# Patient Record
Sex: FEMALE | Race: WHITE | NOT HISPANIC OR LATINO | Employment: OTHER | URBAN - METROPOLITAN AREA
[De-identification: names, ages, dates, MRNs, and addresses within clinical notes are randomized per-mention and may not be internally consistent; named-entity substitution may affect disease eponyms.]

---

## 2018-02-11 PROBLEM — H91.93 BILATERAL HEARING LOSS: Status: ACTIVE | Noted: 2018-02-11

## 2018-02-11 PROBLEM — R29.6 FREQUENT FALLS: Status: ACTIVE | Noted: 2018-02-11

## 2018-02-11 PROBLEM — I12.9 HYPERTENSIVE KIDNEY DISEASE WITH CKD STAGE III: Status: ACTIVE | Noted: 2018-02-11

## 2018-02-11 PROBLEM — Z91.81 RISK FOR FALLS: Status: ACTIVE | Noted: 2018-02-11

## 2018-02-11 PROBLEM — H61.23 BILATERAL IMPACTED CERUMEN: Status: ACTIVE | Noted: 2018-02-11

## 2018-02-11 PROBLEM — J98.4 CHRONIC LUNG DISEASE: Status: ACTIVE | Noted: 2018-02-11

## 2018-02-11 PROBLEM — F31.12 BIPOLAR AFFECTIVE DISORDER, CURRENTLY MANIC, MODERATE: Status: ACTIVE | Noted: 2018-02-11

## 2018-02-11 PROBLEM — N18.30 HYPERTENSIVE KIDNEY DISEASE WITH CKD STAGE III: Status: ACTIVE | Noted: 2018-02-11

## 2018-02-11 PROBLEM — G91.9 HYDROCEPHALUS: Status: ACTIVE | Noted: 2018-02-11

## 2018-02-11 PROBLEM — E03.9 ACQUIRED HYPOTHYROIDISM: Status: ACTIVE | Noted: 2018-02-11

## 2018-03-09 ENCOUNTER — OFFICE VISIT (OUTPATIENT)
Dept: NEUROLOGY | Facility: CLINIC | Age: 68
End: 2018-03-09
Payer: MEDICARE

## 2018-03-09 ENCOUNTER — TELEPHONE (OUTPATIENT)
Dept: NEUROLOGY | Facility: CLINIC | Age: 68
End: 2018-03-09

## 2018-03-09 VITALS
RESPIRATION RATE: 20 BRPM | BODY MASS INDEX: 22.16 KG/M2 | WEIGHT: 129.81 LBS | DIASTOLIC BLOOD PRESSURE: 72 MMHG | HEIGHT: 64 IN | SYSTOLIC BLOOD PRESSURE: 130 MMHG | HEART RATE: 73 BPM

## 2018-03-09 DIAGNOSIS — N39.41 URGE INCONTINENCE OF URINE: ICD-10-CM

## 2018-03-09 DIAGNOSIS — G91.9 HYDROCEPHALUS: Primary | ICD-10-CM

## 2018-03-09 DIAGNOSIS — R26.81 UNSTEADY GAIT: ICD-10-CM

## 2018-03-09 DIAGNOSIS — R41.3 MEMORY LOSS: ICD-10-CM

## 2018-03-09 PROCEDURE — 99999 PR PBB SHADOW E&M-EST. PATIENT-LVL IV: CPT | Mod: PBBFAC,,, | Performed by: PSYCHIATRY & NEUROLOGY

## 2018-03-09 PROCEDURE — 99204 OFFICE O/P NEW MOD 45 MIN: CPT | Mod: S$GLB,,, | Performed by: PSYCHIATRY & NEUROLOGY

## 2018-03-09 RX ORDER — BUDESONIDE AND FORMOTEROL FUMARATE DIHYDRATE 160; 4.5 UG/1; UG/1
2 AEROSOL RESPIRATORY (INHALATION) EVERY 12 HOURS
COMMUNITY
End: 2018-10-08

## 2018-03-09 NOTE — PROGRESS NOTES
Date: 3/9/2018    Patient ID: Mary Duvall is a 67 y.o. female.    Referring Provider:  Ketan Breaux II, MD    Chief Complaint: hydrocephalus and balance issues      History of Present Illness:  Ms. Duvall is a 67 y.o. female with bipolar disorder on lithium who presents referred by Ketan Breaux II, MD today for evaluation of hydrocephalus on imaging and imbalance. The patient was accompanied by her  who also contributed to the following history.     The patient developed imbalance and difficulty walking in 2013. She has frequent falls since that time. She does trip on things but also feels like she just suddenly loses her balance and falls. No blacking out or passing out except one time when she fell and hit her head it knocked out. Her  notices that her walking has gotten better recently and not worse. She feels like her feet are the issue. She walks better barefoot than with shoes. She has memory loss. Her  notes that she has been forgetting things--this started more recently. She has to write down a lot of things.     She has kidney disease. She has urinary frequency and urgency. She has accidents. This has been going on for years. She keeps a portable toilet by her bed for nighttime.     She has lost about 20 pounds over the past year. There has been no cause for the weight loss. She feels hungry but her  notes she doesn't eat a lot. She has blurry vision and is set to have cataract surgery soon. She has constant tinnitus. She sometimes has a spinning sensation. She has a bad cough and uses breathing machine. When she is working she has palpitations. She also endorses easy bruising, weakness, fatigue, muscle and joint pains, and rashes.     She has a mild tremor when writing. She feels her walking as changed because she is walking on the few bones in her feet. She has pain in her feet and ankles but no numbness/tingling.     She has followed with Dr. Scott. She had a  MRI in 2015 but she does not know what it showed. She brought a CD with her of these images. She is unsure what workup they did. She has been on lithium for a long time.       Review of Systems:   Comprehensive review of systems is negative except as mentioned in the HPI    Allergies:  Review of patient's allergies indicates:   Allergen Reactions    Codeine      Other reaction(s): ithing and nausea    Hydrocodone Itching       Current Medications:  Current Outpatient Prescriptions   Medication Sig Dispense Refill    acetaminophen (TYLENOL) 325 MG tablet Take 325 mg by mouth every 6 (six) hours as needed for Pain.      albuterol (PROVENTIL) 2.5 mg /3 mL (0.083 %) nebulizer solution Take 3 mLs (2.5 mg total) by nebulization every 6 (six) hours as needed for Wheezing. 120 each 1    albuterol 90 mcg/actuation inhaler Inhale 2 puffs into the lungs every 6 (six) hours as needed for Wheezing. 1 Inhaler 6    ALPRAZolam (XANAX) 0.5 MG tablet TAKE 1 TABLET BY MOUTH 2 TIMES DAILY AS NEEDED FOR ANXIETY 60 tablet 1    beclomethasone (QVAR) 80 mcg/actuation Aero Inhale 1 puff into the lungs 2 (two) times daily. Controller 1 each 6    budesonide-formoterol 160-4.5 mcg (SYMBICORT) 160-4.5 mcg/actuation HFAA Inhale 2 puffs into the lungs every 12 (twelve) hours. Controller      diphenhydrAMINE (BENADRYL) 25 mg capsule Take 25 mg by mouth every 6 (six) hours as needed for Itching.      fluticasone-vilanterol (BREO) 200-25 mcg/dose DsDv diskus inhaler Inhale 1 puff into the lungs once daily. Controller 1 each 0    hydroCHLOROthiazide (HYDRODIURIL) 50 MG tablet TAKE 1 TABLET BY MOUTH DAILY 30 tablet 9    lansoprazole (PREVACID) 30 MG capsule Take 30 mg by mouth once daily.      levothyroxine (SYNTHROID) 25 MCG tablet Take 1 tablet (25 mcg total) by mouth once daily. 30 tablet 3    lithium (ESKALITH) 300 MG capsule TAKE 2 CAPSULES DAILY AS DIRECTED 60 capsule 6    loratadine (CLARITIN) 10 mg tablet Take 10 mg by mouth  "once daily.      meloxicam (MOBIC) 15 MG tablet Take 15 mg by mouth once daily. With food.  2     No current facility-administered medications for this visit.        Past Medical History:  Past Medical History:   Diagnosis Date    Abdominal pain     Adverse effect of drug/medicinal     Bipolar affective disorder     Bunion     Cancer     COPD (chronic obstructive pulmonary disease)     Elevated alkaline phosphatase level     Encounter for therapeutic drug monitoring     Essential hypertension, benign     Family history of Alzheimer's disease     Foot pain, bilateral     GERD (gastroesophageal reflux disease)     Hip pain, right     Hyperglycemia     Hypokalemia     Knee pain     Murmur     Sciatica     Skin tag        Past Surgical History:  Past Surgical History:   Procedure Laterality Date    ADENOIDECTOMY      CHOLECYSTECTOMY      FOOT SURGERY Left 06/20/2006    MULTIPLE TOOTH EXTRACTIONS  04/2010    TONSILLECTOMY      TOTAL ABDOMINAL HYSTERECTOMY         Family History:  family history includes Heart attack in her mother; Heart disease in her mother; Heart failure in her mother; Kidney disease in her father; Stroke in her maternal grandfather.    Social History:   reports that she has never smoked. She has never used smokeless tobacco. She reports that she drinks alcohol.    Physical Exam:  Vitals:    03/09/18 0900   BP: 130/72   Pulse: 73   Resp: 20   Weight: 58.9 kg (129 lb 12.8 oz)   Height: 5' 4" (1.626 m)   PainSc: 0-No pain     Body mass index is 22.28 kg/m².  General: Well developed, well nourished.  No acute distress.  HEENT: Atraumatic, normocephalic.  Cardiovascular: No carotid bruits.   Musculoskeletal: No obvious joint deformities, moves all extremities well.  Skin: No obvious rashes    Neurological Exam:  Mental status: Awake, alert. Recent and remote memory appear to be intact.   Speech/Language: Edentulous dysarthria or aphasia on conversation.   Cranial nerves: Pupils " equal round and reactive to light, extraocular movements intact, facial strength and sensation intact bilaterally, palate and tongue midline, hearing grossly intact bilaterally.  Motor: 5 out of 5 strength throughout the upper and lower extremities bilaterally. Normal bulk and tone.   Sensation: Intact to light touch, pinprick, and vibration bilaterally.  DTR: 2+ at the knees and biceps bilaterally.  Coordination: Finger-nose-finger testing and rapid alternating movements normal bilaterally. Very mild action tremor. No intention tremor.  Normal heel to shin. No ataxia.   Gait: Cautious but steady gait. Able to heel, toe, and tandem walk. Narrow based.       Data:  I have personally reviewed the referring provider's notes, labs, & imaging made available to me today.       Labs:  CBC:   Lab Results   Component Value Date    WBC 7.61 12/16/2017    HGB 13.2 12/16/2017    HCT 42.3 12/16/2017     12/16/2017     (H) 12/16/2017    RDW 13.6 12/16/2017     BMP:   Lab Results   Component Value Date     12/16/2017    K 4.4 12/16/2017     12/16/2017    CO2 27 12/16/2017    BUN 18 12/16/2017    CREATININE 1.27 12/16/2017     12/16/2017    CALCIUM 10.6 (H) 12/16/2017     LFTS;   Lab Results   Component Value Date    PROT 7.2 12/16/2017    ALBUMIN 4.7 12/16/2017    BILITOT 0.7 12/16/2017    AST 27 12/16/2017    ALKPHOS 80 12/16/2017    ALT 33 12/16/2017     COAGS: No results found for: INR, PROTIME, PTT  FLP: No results found for: CHOL, HDL, LDLCALC, TRIG, CHOLHDL      Imaging:  I have personally reviewed the imaging, MRI brain from 2015 shows some enlargement of the ventricles and mild atrophy. Comparing to the more recent MRI from December 2017, there is more atrophy on the more recent scan but ventricles are similar in size.     Assessment and Plan:  Ms. Duvall is a 67 y.o. female with bipolar disorder on lithium who presents referred by Ketan Breaux II, MD today for evaluation of  hydrocephalus on imaging and imbalance.     Her exam is not characteristic of normal pressure hydrocephalus. Her exam is rather unrevealing and does not point to a neurologic cause for her gait disorder (i.e no ataxia, no neuropathy, no weakness, no parkinsonism, etc). Her imaging does show enlargement of the ventricles but I am not convinced these are out of proportion to her atrophy. She does have memory concerns and some urinary difficulties. Therefore, we will investigate for NPH by performing a LP with PT evaluation before and after for objective measures of improvement. If objective improvement, we will refer to neurosurgery. I will also check labs that could lead to imbalance or unsteadiness. Should these return negative, I think this may be a side effect of the lithium. Physical therapy with gait training would likely be beneficial for the patient.     We will also request the outside records from her [facility] neurologist for my review. I will plan to see her back after the LP for review.     Hydrocephalus  -     FL Lumbar Puncture (xpd); Future; Expected date: 03/09/2018  -     Ambulatory consult to Physical Therapy  -     Vitamin B12; Future; Expected date: 03/09/2018  -     VITAMIN E; Future; Expected date: 03/09/2018  -     Paraneoplastic Autoantibody Evaulation, Serum; Future; Expected date: 03/09/2018    Unsteady gait  -     FL Lumbar Puncture (xpd); Future; Expected date: 03/09/2018  -     Ambulatory consult to Physical Therapy  -     Vitamin B12; Future; Expected date: 03/09/2018  -     VITAMIN E; Future; Expected date: 03/09/2018  -     Paraneoplastic Autoantibody Evaulation, Serum; Future; Expected date: 03/09/2018    Urge incontinence of urine    Memory loss

## 2018-03-09 NOTE — LETTER
March 9, 2018      Ketan Breaux II, MD  [address]  [place] [address]           [facility]  [address] [facility] [address] [place] [address]  Phone: [phone]  Fax: [phone]          Patient: Mary Duvall   MR Number: [id number]   YOB: 1950   Date of Visit: 3/9/2018       Dear Dr. Ketan Breaux II:    Thank you for referring Mary Duvall to me for evaluation. Attached you will find relevant portions of my assessment and plan of care.    If you have questions, please do not hesitate to call me. I look forward to following Mary Duvall along with you.    Sincerely,    Denia Martin MD    Enclosure  CC:  No Recipients    If you would like to receive this communication electronically, please contact [email or web address] or [phone] to request more information on [employer] access.    For providers and/or their staff who would like to refer a patient to [facility], please contact us through our one-stop-shop provider referral line, [facility], at [phone].    If you feel you have received this communication in error or would no longer like to receive these types of communications, please e-mail [email or web address]

## 2018-05-21 PROBLEM — R26.9 GAIT ABNORMALITY: Status: ACTIVE | Noted: 2018-05-21

## 2018-11-07 ENCOUNTER — OFFICE VISIT (OUTPATIENT)
Dept: NEUROLOGY | Facility: CLINIC | Age: 68
End: 2018-11-07
Payer: MEDICARE

## 2018-11-07 VITALS
BODY MASS INDEX: 25.17 KG/M2 | WEIGHT: 137.63 LBS | RESPIRATION RATE: 18 BRPM | HEART RATE: 89 BPM | DIASTOLIC BLOOD PRESSURE: 90 MMHG | SYSTOLIC BLOOD PRESSURE: 134 MMHG

## 2018-11-07 DIAGNOSIS — R26.81 UNSTEADY GAIT: Primary | ICD-10-CM

## 2018-11-07 DIAGNOSIS — R29.6 FREQUENT FALLS: ICD-10-CM

## 2018-11-07 DIAGNOSIS — R41.3 MEMORY LOSS: ICD-10-CM

## 2018-11-07 DIAGNOSIS — R29.898 BILATERAL LEG WEAKNESS: ICD-10-CM

## 2018-11-07 PROCEDURE — 99215 OFFICE O/P EST HI 40 MIN: CPT | Mod: S$GLB,,, | Performed by: PSYCHIATRY & NEUROLOGY

## 2018-11-07 PROCEDURE — 1101F PT FALLS ASSESS-DOCD LE1/YR: CPT | Mod: S$GLB,,, | Performed by: PSYCHIATRY & NEUROLOGY

## 2018-11-07 PROCEDURE — 99999 PR PBB SHADOW E&M-EST. PATIENT-LVL IV: CPT | Mod: PBBFAC,,, | Performed by: PSYCHIATRY & NEUROLOGY

## 2018-11-07 NOTE — PROGRESS NOTES
Date: 11/7/2018    Patient ID: Mary Duvall is a 68 y.o. female.    Chief Complaint: Follow-up      History of Present Illness:  Ms. Duvall is a 68 y.o. female who presents for followup of gait trouble, frequent falls, and memory concerns. The patient was accompanied by her  who also contributed to the following history.     She has no lower extremity strength per her . She has fallen multiple times and hit her head, most recently 11/4/2018. Sometimes she gets dizzy or lightheaded before the fall. This has recently started. She also notes that sometimes her legs move faster than they ought to and her body can't keep up.     She takes benadryl every night. She takes xanax BID. She takes lithium and gabapentin as well.     She has gained about 8 pounds since March 2018.     She has some word finding difficulties and short term memory trouble. Her speech is slurred sometimes.     Review of Systems:   Comprehensive review of systems is negative except as mentioned in the HPI    Allergies:  Review of patient's allergies indicates:   Allergen Reactions    Codeine      Other reaction(s): ithing and nausea    Hydrocodone Itching       Current Medications:  Current Outpatient Medications   Medication Sig Dispense Refill    acetaminophen (TYLENOL) 325 MG tablet Take 325 mg by mouth every 6 (six) hours as needed for Pain.      albuterol (PROVENTIL) 2.5 mg /3 mL (0.083 %) nebulizer solution Take 3 mLs (2.5 mg total) by nebulization every 6 (six) hours as needed for Wheezing. 120 each 1    albuterol 90 mcg/actuation inhaler Inhale 2 puffs into the lungs every 6 (six) hours as needed for Wheezing. 1 Inhaler 6    ALPRAZolam (XANAX) 0.5 MG tablet TAKE 1 TABLET BY MOUTH 2 TIMES DAILY AS NEEDED FOR ANXIETY 60 tablet 1    diphenhydrAMINE (BENADRYL) 25 mg capsule Take 25 mg by mouth every 6 (six) hours as needed for Itching.      fluticasone-vilanterol (BREO) 200-25 mcg/dose DsDv diskus inhaler Inhale 1  puff into the lungs once daily. Controller 1 each 0    gabapentin (NEURONTIN) 600 MG tablet Take 600 mg by mouth 2 (two) times daily.  0    hydroCHLOROthiazide (HYDRODIURIL) 50 MG tablet TAKE 1 TABLET BY MOUTH DAILY 30 tablet 9    lansoprazole (PREVACID) 30 MG capsule Take 30 mg by mouth once daily.      lithium (ESKALITH) 300 MG capsule TAKE 2 CAPSULES DAILY AS DIRECTED 60 capsule 6    loratadine (CLARITIN) 10 mg tablet Take 10 mg by mouth once daily.       No current facility-administered medications for this visit.        Past Medical History:  Past Medical History:   Diagnosis Date    Abdominal pain     Adverse effect of drug/medicinal     Bipolar affective disorder     Bunion     Cancer     COPD (chronic obstructive pulmonary disease)     Elevated alkaline phosphatase level     Encounter for therapeutic drug monitoring     Essential hypertension, benign     Family history of Alzheimer's disease     Foot pain, bilateral     GERD (gastroesophageal reflux disease)     Hip pain, right     Hyperglycemia     Hypokalemia     Knee pain     Murmur     Sciatica     Skin tag        Past Surgical History:  Past Surgical History:   Procedure Laterality Date    ADENOIDECTOMY      CHOLECYSTECTOMY      FOOT SURGERY Left 06/20/2006    MULTIPLE TOOTH EXTRACTIONS  04/2010    TONSILLECTOMY      TOTAL ABDOMINAL HYSTERECTOMY         Family History:  family history includes Heart attack in her mother; Heart disease in her mother; Heart failure in her mother; Kidney disease in her father; Stroke in her maternal grandfather.    Social History:   reports that  has never smoked. she has never used smokeless tobacco. She reports that she drinks alcohol.    Physical Exam:  Vitals:    11/07/18 1510   BP: (!) 134/90   Pulse: 89   Resp: 18   Weight: 62.4 kg (137 lb 9.6 oz)   PainSc: 0-No pain     Body mass index is 25.17 kg/m².  General: Well developed, well nourished.  No acute distress.  Musculoskeletal: No  obvious joint deformities, moves all extremities well.  Peripheral vascular: No edema noted    Neurological Exam:  Mental status: Awake, alert. MOCA 17/30 (see below). Attention, concentration, and fund of knowledge regarding recent events normal.   Speech/Language: No dysarthria or aphasia on conversation.   Cranial nerves: Visual fields full. Pupils equal round and reactive to light, Cannot look up bilaterally, facial strength and sensation intact bilaterally, palate and tongue midline, hearing grossly intact bilaterally. Shoulder shrug normal bilaterally.   Motor: 5 out of 5 strength throughout the upper and lower extremities bilaterally except bilateral hip flexor weakness 3/5 bilaterally. Normal bulk and tone.   Sensation: Intact to light touch, pinprick, and vibration bilaterally but less on the right.  DTR: 2+ at the knees and biceps bilaterally.  Coordination: Finger-nose-finger testing and rapid alternating movements normal bilaterally. No tremor.   Gait: narrow based, shuffling, stooped posture, lurching and imbalanced, festination            Data:  I have personally reviewed the referring provider's notes, labs, & imaging made available to me today.       Labs:  CBC:   Lab Results   Component Value Date    WBC 6.12 09/05/2018    HGB 14.0 09/05/2018    HCT 44.5 09/05/2018     09/05/2018     (H) 09/05/2018    RDW 14.6 (H) 09/05/2018     BMP:   Lab Results   Component Value Date     09/05/2018    K 3.8 09/05/2018     09/05/2018    CO2 28 09/05/2018    BUN 18 09/05/2018    CREATININE 1.26 09/05/2018    GLU 89 09/05/2018    CALCIUM 10.5 (H) 09/05/2018     LFTS;   Lab Results   Component Value Date    PROT 8.1 09/05/2018    ALBUMIN 4.7 09/05/2018    BILITOT 0.5 09/05/2018    AST 31 09/05/2018    ALKPHOS 104 09/05/2018    ALT 29 09/05/2018         Imaging:  I have personally reviewed the imaging, CT head from ER last week shows no ventriculomegaly.     Assessment and Plan:  I am event  less suspicious today of NPH and find her exam today more concerning for possible PSP (superior gaze palsy, poor MOCA score, parkinsonian walk with festination and significant imbalance). I discussed this with them. Will check labs to rule out mimickers. Will also check a MRI lumbar spine given her significant hip flexor weakness bilaterally. We will check PET scan looking for hypometabolism in the brainstem to suggest PSP. I will see her back after testing to review results.     Unsteady gait  -     Vitamin B12; Future; Expected date: 11/07/2018  -     TSH; Future; Expected date: 11/07/2018  -     VITAMIN E; Future; Expected date: 11/07/2018  -     Paraneoplastic Autoantibody Evaulation, Serum; Future; Expected date: 11/07/2018  -     MRI Lumbar Spine Without Contrast; Future; Expected date: 11/07/2018  -     NM PET Brain; Future; Expected date: 11/07/2018  -     Ambulatory consult to Physical Therapy    Frequent falls  -     Vitamin B12; Future; Expected date: 11/07/2018  -     TSH; Future; Expected date: 11/07/2018  -     VITAMIN E; Future; Expected date: 11/07/2018  -     Paraneoplastic Autoantibody Evaulation, Serum; Future; Expected date: 11/07/2018  -     MRI Lumbar Spine Without Contrast; Future; Expected date: 11/07/2018  -     NM PET Brain; Future; Expected date: 11/07/2018  -     Ambulatory consult to Physical Therapy    Memory loss  -     Vitamin B12; Future; Expected date: 11/07/2018  -     TSH; Future; Expected date: 11/07/2018  -     VITAMIN E; Future; Expected date: 11/07/2018  -     Paraneoplastic Autoantibody Evaulation, Serum; Future; Expected date: 11/07/2018  -     MRI Lumbar Spine Without Contrast; Future; Expected date: 11/07/2018  -     NM PET Brain; Future; Expected date: 11/07/2018    Bilateral leg weakness  -     MRI Lumbar Spine Without Contrast; Future; Expected date: 11/07/2018  -     Ambulatory consult to Physical Therapy

## 2018-11-23 ENCOUNTER — HOSPITAL ENCOUNTER (OUTPATIENT)
Dept: RADIOLOGY | Facility: HOSPITAL | Age: 68
Discharge: HOME | End: 2018-11-23
Attending: PSYCHIATRY & NEUROLOGY
Payer: MEDICARE

## 2018-11-23 DIAGNOSIS — R29.898 BILATERAL LEG WEAKNESS: ICD-10-CM

## 2018-11-23 DIAGNOSIS — R26.81 UNSTEADY GAIT: ICD-10-CM

## 2018-11-23 DIAGNOSIS — R41.3 MEMORY LOSS: ICD-10-CM

## 2018-11-23 DIAGNOSIS — R29.6 FREQUENT FALLS: ICD-10-CM

## 2018-11-23 PROCEDURE — 72148 MRI LUMBAR SPINE W/O DYE: CPT | Mod: TC,PO

## 2018-11-23 PROCEDURE — 72148 MRI LUMBAR SPINE W/O DYE: CPT | Mod: 26,,, | Performed by: RADIOLOGY

## 2018-11-29 ENCOUNTER — TELEPHONE (OUTPATIENT)
Dept: NEUROLOGY | Facility: CLINIC | Age: 68
End: 2018-11-29

## 2018-11-29 ENCOUNTER — HOSPITAL ENCOUNTER (OUTPATIENT)
Dept: RADIOLOGY | Facility: HOSPITAL | Age: 68
Discharge: HOME | End: 2018-11-29
Attending: PSYCHIATRY & NEUROLOGY
Payer: MEDICARE

## 2018-11-29 ENCOUNTER — HOSPITAL ENCOUNTER (EMERGENCY)
Facility: HOSPITAL | Age: 68
Discharge: HOME | End: 2018-11-29
Attending: EMERGENCY MEDICINE
Payer: MEDICARE

## 2018-11-29 VITALS
SYSTOLIC BLOOD PRESSURE: 139 MMHG | TEMPERATURE: 98 F | DIASTOLIC BLOOD PRESSURE: 91 MMHG | HEIGHT: 65 IN | OXYGEN SATURATION: 99 % | BODY MASS INDEX: 22.49 KG/M2 | WEIGHT: 135 LBS | HEART RATE: 80 BPM | RESPIRATION RATE: 18 BRPM

## 2018-11-29 DIAGNOSIS — R29.6 FREQUENT FALLS: ICD-10-CM

## 2018-11-29 DIAGNOSIS — G44.309 POST-TRAUMATIC HEADACHE, NOT INTRACTABLE, UNSPECIFIED CHRONICITY PATTERN: ICD-10-CM

## 2018-11-29 DIAGNOSIS — R41.3 MEMORY LOSS: ICD-10-CM

## 2018-11-29 DIAGNOSIS — S01.01XA LACERATION OF SCALP WITHOUT FOREIGN BODY, INITIAL ENCOUNTER: Primary | ICD-10-CM

## 2018-11-29 DIAGNOSIS — R26.81 GAIT INSTABILITY: ICD-10-CM
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
## 2018-11-29 DIAGNOSIS — R26.81 UNSTEADY GAIT: ICD-10-CM

## 2018-11-29 LAB — POCT GLUCOSE: 95 MG/DL (ref 70–110)

## 2018-11-29 PROCEDURE — 78608 BRAIN IMAGING (PET): CPT | Mod: 26,PI,, | Performed by: RADIOLOGY

## 2018-11-29 PROCEDURE — 78608 BRAIN IMAGING (PET): CPT | Mod: TC

## 2018-11-29 PROCEDURE — 99284 EMERGENCY DEPT VISIT MOD MDM: CPT | Mod: 25,,, | Performed by: EMERGENCY MEDICINE

## 2018-11-29 PROCEDURE — 25000003 PHARM REV CODE 250: Performed by: PHYSICIAN ASSISTANT

## 2018-11-29 PROCEDURE — 12002 RPR S/N/AX/GEN/TRNK2.6-7.5CM: CPT

## 2018-11-29 PROCEDURE — 12002 RPR S/N/AX/GEN/TRNK2.6-7.5CM: CPT | Mod: ,,, | Performed by: EMERGENCY MEDICINE

## 2018-11-29 PROCEDURE — 93010 ELECTROCARDIOGRAM REPORT: CPT | Mod: ,,, | Performed by: INTERNAL MEDICINE

## 2018-11-29 PROCEDURE — 99285 EMERGENCY DEPT VISIT HI MDM: CPT | Mod: 25

## 2018-11-29 PROCEDURE — 93005 ELECTROCARDIOGRAM TRACING: CPT

## 2018-11-29 RX ORDER — ACETAMINOPHEN 325 MG/1
650 TABLET ORAL
Status: COMPLETED | OUTPATIENT
Start: 2018-11-29 | End: 2018-11-29

## 2018-11-29 RX ORDER — LIDOCAINE HYDROCHLORIDE 10 MG/ML
5 INJECTION, SOLUTION EPIDURAL; INFILTRATION; INTRACAUDAL; PERINEURAL
Status: COMPLETED | OUTPATIENT
Start: 2018-11-29 | End: 2018-11-29

## 2018-11-29 RX ADMIN — ACETAMINOPHEN 650 MG: 325 TABLET ORAL at 07:11

## 2018-11-29 RX ADMIN — LIDOCAINE HYDROCHLORIDE 50 MG: 10 INJECTION, SOLUTION EPIDURAL; INFILTRATION; INTRACAUDAL; PERINEURAL at 06:11

## 2018-11-29 NOTE — TELEPHONE ENCOUNTER
----- Message from Denia Martin MD sent at 11/26/2018  9:22 AM CST -----  The MRI of the low back shows potential for a pinched nerve in the back but it is at a different level than I would expect for the weakness I found in your legs. I recommend a test called EMG/nerve conduction study. This is a nerve and muscle test that can help us determine why someone has weakness or if the possible pinched nerve in the back has anything to do with it. The test is done in 2 parts. They will give your nerves little electric shocks and then the second part they take a tiny needle and put it into the nerves to measure the muscle activity. Let us know if you have any questions. You should be contacted to schedule this soon.

## 2018-11-29 NOTE — ED TRIAGE NOTES
Slip and fall while in the parking hitting the back of head, +LOC, bleeding controlled at this time, c-collar removed per patient, pt denies neck pain at this time.

## 2018-11-29 NOTE — ED PROVIDER NOTES
Encounter Date: 11/29/2018    SCRIBE #1 NOTE: I, Kimberly Pina, am scribing for, and in the presence of,  Dr. Moscoso. I have scribed the following portions of the note - the APC attestation.       History     Chief Complaint   Patient presents with    Head Injury     trip and fall, not on blood thinners, hit head, family member reports + loc     Patient is a 68 year old female with PMHX of Alzheimer's disease, HTN, GERD, COPD, hypothyroidism, and bipolar disorder. She presents to the ED for head injury s/p fall. Brother in law reports patient ambulating within parking garage and falling backwards and striking right side of head onto ground. + LOC. Patient denies hx of anticoagulation. Patient reports having diffuse headache. Describes pain as constant and stabbing. Rates pain 10/10. Patient with hx of frequent falls and gait instability since 2013. Patient underwent NM PET scan of brain for further evaluation of gait ataxia today.       The history is provided by the patient and a relative. No  was used.     Review of patient's allergies indicates:   Allergen Reactions    Codeine      Other reaction(s): ithing and nausea    Hydrocodone Itching     Past Medical History:   Diagnosis Date    Abdominal pain     Adverse effect of drug/medicinal     Bipolar affective disorder     Bunion     Cancer     COPD (chronic obstructive pulmonary disease)     Elevated alkaline phosphatase level     Encounter for therapeutic drug monitoring     Essential hypertension, benign     Family history of Alzheimer's disease     Foot pain, bilateral     GERD (gastroesophageal reflux disease)     Hip pain, right     Hyperglycemia     Hypokalemia     Knee pain     Murmur     Sciatica     Skin tag      Past Surgical History:   Procedure Laterality Date    ADENOIDECTOMY      CHOLECYSTECTOMY      FOOT SURGERY Left 06/20/2006    MULTIPLE TOOTH EXTRACTIONS  04/2010    TONSILLECTOMY      TOTAL  ABDOMINAL HYSTERECTOMY       Family History   Problem Relation Age of Onset    Heart failure Mother     Heart disease Mother     Heart attack Mother     Stroke Maternal Grandfather     Kidney disease Father      Social History     Tobacco Use    Smoking status: Never Smoker    Smokeless tobacco: Never Used   Substance Use Topics    Alcohol use: No     Frequency: Never    Drug use: No     Review of Systems   Unable to perform ROS: Dementia       Physical Exam     Initial Vitals [11/29/18 1542]   BP Pulse Resp Temp SpO2   (!) 130/57 89 18 98.1 °F (36.7 °C) 99 %      MAP       --         Physical Exam    Vitals reviewed.  Constitutional: She appears well-developed and well-nourished. No distress.   HENT:   Head: Normocephalic. Head is with laceration (3 cm laceration over right parietal region).   Eyes: Conjunctivae and EOM are normal.   Neck: Normal range of motion. No spinous process tenderness and no muscular tenderness present. Normal range of motion present.   Cardiovascular: Normal rate and regular rhythm.   Murmur heard.  Pulmonary/Chest: Breath sounds normal. No respiratory distress. She has no wheezes. She has no rales.   Abdominal: Soft. Bowel sounds are normal. She exhibits no distension. There is no tenderness.   Musculoskeletal: Normal range of motion.   No midline spinal tenderness.    Neurological: She is alert. She has normal strength. No sensory deficit. Coordination normal. GCS eye subscore is 4. GCS verbal subscore is 4. GCS motor subscore is 6.   Patient oriented to self and time. Patient disoriented to place. Motor strength of b/l UE and LE 5/5. Finger to nose negative. Pronator drift negative. No facial droop or asymmetry. Speech is clear. Tongue protrudes midline with no fasciculations. Gait not assessed.   Skin: Skin is warm and dry. No erythema.         ED Course   Lac Repair  Date/Time: 11/29/2018 7:07 PM  Performed by: Christy Hudson PA-C  Authorized by: Lisa Moscoso MD    Body area: head/neck  Location details: scalp  Laceration length: 3 cm  Foreign bodies: no foreign bodies  Tendon involvement: none  Nerve involvement: none  Vascular damage: no  Anesthesia: local infiltration    Anesthesia:  Local Anesthetic: lidocaine 1% without epinephrine  Anesthetic total: 4 mL  Preparation: Patient was prepped and draped in the usual sterile fashion.  Irrigation solution: saline  Irrigation method: jet lavage  Amount of cleaning: standard  Debridement: none  Skin closure: staples  Number of sutures: 2  Technique: simple  Approximation: close  Approximation difficulty: simple  Patient tolerance: Patient tolerated the procedure well with no immediate complications        Labs Reviewed - No data to display       Imaging Results          CT Head Without Contrast (Final result)  Result time 11/29/18 18:43:41    Final result by Sai Pitts MD (11/29/18 18:43:41)                 Impression:      No evidence of acute major vascular distribution infarct or intracranial hemorrhage.    Chronic microvascular ischemic disease.    Small scalp acute hematoma in the right frontoparietal region.    Electronically signed by resident: Car Browne  Date:    11/29/2018  Time:    18:17    Electronically signed by: Sai Pitts MD  Date:    11/29/2018  Time:    18:43             Narrative:    EXAMINATION:  CT HEAD WITHOUT CONTRAST    CLINICAL HISTORY:  Headache, post trauma    TECHNIQUE:  Low dose axial images were obtained through the head.  Coronal and sagittal reformations were also performed. Contrast was not administered.    COMPARISON:  CT head 09/05/2018 and MRI of the brain dated 12/16/2017.    FINDINGS:  The ventricles are unchanged in size, slightly enlarged in the setting of generalized cerebral volume loss.    Brain parenchyma appears within normal limits for age.  There is patchy supratentorial white matter hypoattenuation, nonspecific but most commonly associated with chronic microvascular ischemic  disease.  No evidence of acute major vascular distribution infarct or intracranial hemorrhage.  No extra-axial collections.    Calvarium is intact.  There is a small scalp acute hematoma overlying the right frontoparietal region.    Paranasal sinuses and mastoid air cells are clear.  Orbits are unremarkable.                               CT Cervical Spine Without Contrast (Final result)  Result time 11/29/18 18:19:29    Final result by Handy Vallecillo MD (11/29/18 18:19:29)                 Impression:      1. No acute displaced fracture or dislocation of the cervical spine noting degenerative changes and additional findings above.      Electronically signed by: Handy Vallecillo MD  Date:    11/29/2018  Time:    18:19             Narrative:    EXAMINATION:  CT CERVICAL SPINE WITHOUT CONTRAST    CLINICAL HISTORY:  Neck pain, first study;    TECHNIQUE:  Low dose axial images, sagittal and coronal reformations were performed though the cervical spine.  Contrast was not administered.    COMPARISON:  None    FINDINGS:  The visualized lung apices are grossly unremarkable.  The thyroid gland and parotid glands are grossly unremarkable.  No significant cervical lymphadenopathy.    The paraspinous and hypopharyngeal soft tissues are grossly unremarkable.    Sagittal reformatted imaging demonstrates grossly adequate alignment of the cervical spine allowing for positioning.  There is multilevel disc space height loss without significant vertebral body height loss.  There is multilevel disc degenerative change.  There is multilevel facet arthropathy.  There is multilevel mild to moderate spinal canal stenosis.  There is multilevel neuroforaminal narrowing, noting severe left neuroforaminal narrowing at C3-C4, moderate left neural foraminal narrowing at C4-C5 and C5-C6.  The airway is patent.                                 Medical Decision Making:   History:   Old Medical Records: I decided to obtain old medical  records.  Clinical Tests:   Lab Tests: Ordered and Reviewed  Radiological Study: Ordered and Reviewed  Medical Tests: Ordered and Reviewed       APC / Resident Notes:   Patient is a 68 year old female presents to the ED for emergent evaluation of head injury s/p fall.     Will order imaging. Will order pain medication. Will continue to monitor.     Differential diagnoses include, but are not limited to: SDH, ICH, hydrocephalus, hematoma, or laceration.     CT head found to have Small scalp acute hematoma in the right frontoparietal region. No evidence of acute major vascular distribution infarct or intracranial hemorrhage. CT cervical spine found to have No acute displaced fracture or dislocation.     Patient reassessed, reports symptoms improved with ED management. I have discussed emergency department findings, and plan with the patient and . Will discharge home with F/U with neurology and PCP. Patient and  verbalizes understanding of plan and agrees. Return precautions given.     I have discussed and reviewed with my supervising physician.       Scribe Attestation:   Scribe #1: I performed the above scribed service and the documentation accurately describes the services I performed. I attest to the accuracy of the note.    Attending Attestation:     Physician Attestation Statement for NP/PA:   I have conducted a face to face encounter with this patient in addition to the NP/PA, due to Medical Complexity    Other NP/PA Attestation Additions:    History of Present Illness: 68 year old female with a history of Alzheimer's and gait ataxia, presenting with head injury. Per family she lost her balance while ambulating and fell backward striking the right side of her head, positive for LOC. Patient does have a history of gait instability and CT showing NPH; however, patient has been evaluated by neurology who feels symptoms are not consistent with NPH and are more concerned for PSP for which patient  underwent a PET scan today. Pt endorsing generalized HA.    Physical Exam: On exam patient is alert and mental status baseline. She has a 2cm laceration on right parietal with bleeding controlled. No focal neurological deficits appreciated.    Medical Decision Making: Head CT pending. Plan for closure of head laceration. Concerned for patient's judgement as she has repeatedly tried to leave with an open head wound. She does not recall the need to have imaging results after multiple encounters.     Head lac closed by PA with 2 staples, good approximation. Pt observed by myself to ambulate more steadily, alert and oriented.  Pt's  educated about need for gait safety and additional home care and possible eventual NH placement, he says he will consider these options and d/w pt's PCP.            Clinical Impression:   The primary encounter diagnosis was Laceration of scalp without foreign body, initial encounter. Diagnoses of Fall, Post-traumatic headache, not intractable, unspecified chronicity pattern, Gait instability, and Frequent falls were also pertinent to this visit.      Disposition:   Disposition: Discharged  Condition: Fair                        Christy Hudson PA-C  11/29/18 2010       Lisa Moscoso MD  11/29/18 2023

## 2018-11-29 NOTE — TELEPHONE ENCOUNTER
I attempted to reach this pt using the numbers we have on file but was unsuccessful. Results letter was mailed to the pt, instructed her to call to schedule EMG.

## 2018-11-29 NOTE — ED NOTES
Patient identifiers for Mary Duvall 68 y.o. female checked and correct.  Chief Complaint   Patient presents with    Head Injury     trip and fall, not on blood thinners, hit head, family member reports + loc     Past Medical History:   Diagnosis Date    Abdominal pain     Adverse effect of drug/medicinal     Bipolar affective disorder     Bunion     Cancer     COPD (chronic obstructive pulmonary disease)     Elevated alkaline phosphatase level     Encounter for therapeutic drug monitoring     Essential hypertension, benign     Family history of Alzheimer's disease     Foot pain, bilateral     GERD (gastroesophageal reflux disease)     Hip pain, right     Hyperglycemia     Hypokalemia     Knee pain     Murmur     Sciatica     Skin tag      Allergies reported:   Review of patient's allergies indicates:   Allergen Reactions    Codeine      Other reaction(s): ithing and nausea    Hydrocodone Itching         LOC: Patient is awake, alert, and aware of environment with an appropriate affect. Patient is oriented x 3 and speaking appropriately.  APPEARANCE: Patient resting comfortably and in no acute distress. Patient is clean and well groomed, patient's clothing is properly fastened.  HEENT: laceration to left side of forehead.   SKIN: The skin is warm and dry. Patient has normal skin turgor and moist mucus membranes.   MUSKULOSKELETAL: Patient is moving all extremities well, no obvious deformities noted. Pulses intact.   RESPIRATORY: Airway is open and patent. Respirations are spontaneous and non-labored with normal effort and rate, BBS=clear  NEUROLOGICAL: pupils 4 mm, PERRL. Facial expression is symmetrical. Hand grasps are equal bilaterally. Normal sensation in all extremities when touched with finger.

## 2018-11-30 ENCOUNTER — TELEPHONE (OUTPATIENT)
Dept: NEUROLOGY | Facility: CLINIC | Age: 68
End: 2018-11-30

## 2018-11-30 NOTE — TELEPHONE ENCOUNTER
----- Message from Sharon Garay sent at 11/30/2018  3:22 PM CST -----  Contact: pt  ..Type: Needs Medical Advice    Who Called: pt   Best Call Back Number:   Additional Information: pt stated she fell and had to get staples in her head and is now feeling strange.  Pt would like to speak with a nurse to get advise  Please advise  Thanks

## 2018-11-30 NOTE — TELEPHONE ENCOUNTER
Called patient, she just wanted to let Dr Martin know she had the test done and about the fall. She states she is fine and will see Dr Martin at her appointment to get her results of her test to find out what is going on with her.

## 2018-12-07 ENCOUNTER — TELEPHONE (OUTPATIENT)
Dept: NEUROLOGY | Facility: CLINIC | Age: 68
End: 2018-12-07

## 2018-12-07 NOTE — TELEPHONE ENCOUNTER
Called and spoke with the patient, she just wanted to let Dr Martin know she had the test done and about the fall. She stated she had the staples removed today and was told to call the office to give us an update on how she was doing. She states she is fine and will see Dr Martin at her appointment to get her results of her test to find out what is going on with her.

## 2018-12-11 ENCOUNTER — OFFICE VISIT (OUTPATIENT)
Dept: NEUROLOGY | Facility: CLINIC | Age: 68
End: 2018-12-11
Payer: MEDICARE

## 2018-12-11 VITALS — BODY MASS INDEX: 22.33 KG/M2 | HEIGHT: 65 IN | WEIGHT: 134 LBS | RESPIRATION RATE: 20 BRPM

## 2018-12-11 DIAGNOSIS — R44.3 HALLUCINATION: ICD-10-CM

## 2018-12-11 DIAGNOSIS — F02.818 LATE ONSET ALZHEIMER'S DISEASE WITH BEHAVIORAL DISTURBANCE: Primary | ICD-10-CM

## 2018-12-11 DIAGNOSIS — R29.6 FREQUENT FALLS: ICD-10-CM

## 2018-12-11 DIAGNOSIS — G91.8 HYDROCEPHALUS EX VACUO: ICD-10-CM

## 2018-12-11 DIAGNOSIS — G30.1 LATE ONSET ALZHEIMER'S DISEASE WITH BEHAVIORAL DISTURBANCE: Primary | ICD-10-CM

## 2018-12-11 DIAGNOSIS — R26.89 IMBALANCE: ICD-10-CM

## 2018-12-11 PROCEDURE — 99214 OFFICE O/P EST MOD 30 MIN: CPT | Mod: S$GLB,,, | Performed by: PSYCHIATRY & NEUROLOGY

## 2018-12-11 PROCEDURE — 1101F PT FALLS ASSESS-DOCD LE1/YR: CPT | Mod: S$GLB,,, | Performed by: PSYCHIATRY & NEUROLOGY

## 2018-12-11 PROCEDURE — 99999 PR PBB SHADOW E&M-EST. PATIENT-LVL III: CPT | Mod: PBBFAC,,, | Performed by: PSYCHIATRY & NEUROLOGY

## 2018-12-11 RX ORDER — DONEPEZIL HYDROCHLORIDE 10 MG/1
10 TABLET, FILM COATED ORAL NIGHTLY
Qty: 30 TABLET | Refills: 11 | Status: SHIPPED | OUTPATIENT
Start: 2018-12-11 | End: 2019-02-06

## 2018-12-11 NOTE — PROGRESS NOTES
"    Date: 12/11/2018    Patient ID: Mary Duvall is a 68 y.o. female.    Referring Provider:  No ref. provider found    Chief Complaint: No chief complaint on file.      History of Present Illness:  Ms. Duvall is a 68 y.o. female with alzheimer's type dementia who presents for followup.     Interval history: She had a fall on 11/29 with head injury. She is also starting to halluciate, seeing things and hearing things that aren't really there. She hears music and sounds at night. She hears a man's voice saying "come on now it's time to go to sleep". The only visual hallucination she states she read something on her phone that wasn't really there. PET scan of the brain reviewed personally and showed parietal and temporal hypometabolism consistent with Alzheimer's type dementia.     Review of Systems:   Comprehensive review of systems is negative except as mentioned in the HPI    Allergies:  Review of patient's allergies indicates:   Allergen Reactions    Codeine      Other reaction(s): ithing and nausea    Hydrocodone Itching       Current Medications:  Current Outpatient Medications   Medication Sig Dispense Refill    acetaminophen (TYLENOL) 325 MG tablet Take 325 mg by mouth every 6 (six) hours as needed for Pain.      albuterol (PROVENTIL) 2.5 mg /3 mL (0.083 %) nebulizer solution Take 3 mLs (2.5 mg total) by nebulization every 6 (six) hours as needed for Wheezing. 120 each 1    albuterol 90 mcg/actuation inhaler Inhale 2 puffs into the lungs every 6 (six) hours as needed for Wheezing. 1 Inhaler 6    ALPRAZolam (XANAX) 0.5 MG tablet TAKE 1 TABLET BY MOUTH 2 TIMES DAILY AS NEEDED FOR ANXIETY 60 tablet 1    diphenhydrAMINE (BENADRYL) 25 mg capsule Take 25 mg by mouth every 6 (six) hours as needed for Itching.      fluticasone-vilanterol (BREO) 200-25 mcg/dose DsDv diskus inhaler Inhale 1 puff into the lungs once daily. Controller 1 each 0    gabapentin (NEURONTIN) 600 MG tablet Take 600 mg by mouth 2 " "(two) times daily.  0    hydroCHLOROthiazide (HYDRODIURIL) 50 MG tablet TAKE 1 TABLET BY MOUTH DAILY 30 tablet 9    lansoprazole (PREVACID) 30 MG capsule Take 30 mg by mouth once daily.      lithium (ESKALITH) 300 MG capsule TAKE 2 CAPSULES DAILY AS DIRECTED 60 capsule 6    loratadine (CLARITIN) 10 mg tablet Take 10 mg by mouth once daily.      donepezil (ARICEPT) 10 MG tablet Take 1 tablet (10 mg total) by mouth every evening. Start half tablet (5 mg) nightly for 1 week then increase to 10 mg nightly thereafter 30 tablet 11     No current facility-administered medications for this visit.        Past Medical History:  Past Medical History:   Diagnosis Date    Abdominal pain     Adverse effect of drug/medicinal     Bipolar affective disorder     Bunion     Cancer     COPD (chronic obstructive pulmonary disease)     Elevated alkaline phosphatase level     Encounter for therapeutic drug monitoring     Essential hypertension, benign     Family history of Alzheimer's disease     Foot pain, bilateral     GERD (gastroesophageal reflux disease)     Hip pain, right     Hyperglycemia     Hypokalemia     Knee pain     Murmur     Sciatica     Skin tag        Past Surgical History:  Past Surgical History:   Procedure Laterality Date    ADENOIDECTOMY      CHOLECYSTECTOMY      FOOT SURGERY Left 06/20/2006    MULTIPLE TOOTH EXTRACTIONS  04/2010    TONSILLECTOMY      TOTAL ABDOMINAL HYSTERECTOMY         Family History:  family history includes Heart attack in her mother; Heart disease in her mother; Heart failure in her mother; Kidney disease in her father; Stroke in her maternal grandfather.    Social History:   reports that  has never smoked. she has never used smokeless tobacco. She reports that she does not drink alcohol or use drugs.    Physical Exam:  Vitals:    12/11/18 1442   Resp: 20   Weight: 60.8 kg (134 lb)   Height: 5' 4.5" (1.638 m)   PainSc:   8   PainLoc: Toe     Body mass index is 22.65 " kg/m².  General: Well developed, well nourished.  No acute distress.  Musculoskeletal: No obvious joint deformities, moves all extremities well.  Peripheral vascular: No edema noted    Neurological Exam:  Mental status: Awake, alert.  Speech/Language: edentulous dysarthria  Cranial nerves: face symmetric  Motor: moves all extremities well, no rigidity.   Coordination: Finger-nose-finger testing and rapid alternating movements normal bilaterally. No tremor.   Gait: narrow based, shuffling, stooped posture, lurching and imbalanced, festination    Data:  I have personally reviewed the referring provider's notes, labs, & imaging made available to me today.     Labs:  CBC:   Lab Results   Component Value Date    WBC 6.12 09/05/2018    HGB 14.0 09/05/2018    HCT 44.5 09/05/2018     09/05/2018     (H) 09/05/2018    RDW 14.6 (H) 09/05/2018     BMP:   Lab Results   Component Value Date     09/05/2018    K 3.8 09/05/2018     09/05/2018    CO2 28 09/05/2018    BUN 18 09/05/2018    CREATININE 1.26 09/05/2018    GLU 89 09/05/2018    CALCIUM 10.5 (H) 09/05/2018     LFTS;   Lab Results   Component Value Date    PROT 8.1 09/05/2018    ALBUMIN 4.7 09/05/2018    BILITOT 0.5 09/05/2018    AST 31 09/05/2018    ALKPHOS 104 09/05/2018    ALT 29 09/05/2018     COAGS: No results found for: INR, PROTIME, PTT  FLP: No results found for: CHOL, HDL, LDLCALC, TRIG, CHOLHDL      Imaging:  See HPI    Assessment and Plan:  PET looked consistent with Alzheimer's type dementia. No evidence of PSP though the clinical history fits better with PSP or LBD. We will start aricept 5 mg nightly for 1 week then increase to 10 mg nightly. We may consider seroquel for hallucinations but I don't want to start two medications at once. She is on lithium for bipolar disorder and may ultimately need psychiatry assistance to aid in management. For the imbalance, I will refer to PT for gait and balance training. Her exam and scans are not  consistent with NPH and more consistent with hydrocephalus ex vacuo.     Late onset Alzheimer's disease with behavioral disturbance    Imbalance  -     Cancel: Ambulatory consult to Physical Therapy  -     Ambulatory consult to Physical Therapy    Frequent falls    Hallucination    Hydrocephalus ex vacuo    Other orders  -     donepezil (ARICEPT) 10 MG tablet; Take 1 tablet (10 mg total) by mouth every evening. Start half tablet (5 mg) nightly for 1 week then increase to 10 mg nightly thereafter  Dispense: 30 tablet; Refill: 11

## 2019-02-06 ENCOUNTER — OFFICE VISIT (OUTPATIENT)
Dept: NEUROLOGY | Facility: CLINIC | Age: 69
End: 2019-02-06
Payer: MEDICARE

## 2019-02-06 VITALS
HEIGHT: 65 IN | BODY MASS INDEX: 22.51 KG/M2 | WEIGHT: 135.13 LBS | SYSTOLIC BLOOD PRESSURE: 134 MMHG | RESPIRATION RATE: 20 BRPM | HEART RATE: 70 BPM | DIASTOLIC BLOOD PRESSURE: 62 MMHG

## 2019-02-06 DIAGNOSIS — R26.9 GAIT ABNORMALITY: ICD-10-CM

## 2019-02-06 DIAGNOSIS — R44.3 HALLUCINATION: ICD-10-CM

## 2019-02-06 DIAGNOSIS — F02.818 LATE ONSET ALZHEIMER'S DISEASE WITH BEHAVIORAL DISTURBANCE: Primary | ICD-10-CM

## 2019-02-06 DIAGNOSIS — G30.1 LATE ONSET ALZHEIMER'S DISEASE WITH BEHAVIORAL DISTURBANCE: Primary | ICD-10-CM

## 2019-02-06 DIAGNOSIS — G91.8 HYDROCEPHALUS EX VACUO: ICD-10-CM

## 2019-02-06 DIAGNOSIS — R29.6 FREQUENT FALLS: ICD-10-CM

## 2019-02-06 PROCEDURE — 99214 PR OFFICE/OUTPT VISIT, EST, LEVL IV, 30-39 MIN: ICD-10-PCS | Mod: S$GLB,,, | Performed by: PSYCHIATRY & NEUROLOGY

## 2019-02-06 PROCEDURE — 99214 OFFICE O/P EST MOD 30 MIN: CPT | Mod: S$GLB,,, | Performed by: PSYCHIATRY & NEUROLOGY

## 2019-02-06 PROCEDURE — 1101F PR PT FALLS ASSESS DOC 0-1 FALLS W/OUT INJ PAST YR: ICD-10-PCS | Mod: S$GLB,,, | Performed by: PSYCHIATRY & NEUROLOGY

## 2019-02-06 PROCEDURE — 99999 PR PBB SHADOW E&M-EST. PATIENT-LVL III: ICD-10-PCS | Mod: PBBFAC,,, | Performed by: PSYCHIATRY & NEUROLOGY

## 2019-02-06 PROCEDURE — 99999 PR PBB SHADOW E&M-EST. PATIENT-LVL III: CPT | Mod: PBBFAC,,, | Performed by: PSYCHIATRY & NEUROLOGY

## 2019-02-06 PROCEDURE — 1101F PT FALLS ASSESS-DOCD LE1/YR: CPT | Mod: S$GLB,,, | Performed by: PSYCHIATRY & NEUROLOGY

## 2019-02-06 RX ORDER — LEVOTHYROXINE SODIUM 50 UG/1
50 TABLET ORAL
COMMUNITY

## 2019-02-06 RX ORDER — DONEPEZIL HYDROCHLORIDE 10 MG/1
10 TABLET, FILM COATED ORAL NIGHTLY
Qty: 30 TABLET | Refills: 11 | Status: SHIPPED | OUTPATIENT
Start: 2019-02-06 | End: 2019-12-04

## 2019-02-06 NOTE — PROGRESS NOTES
Date: 2/6/2019    Patient ID: Mary Duvall is a 68 y.o. female.    Chief Complaint: Alzheimers      History of Present Illness:  Ms. Duvall is a 68 y.o. female who presents for followup of dementia--question lewy body dementia or PSP (PET showed Alzheimer's disease). The patient was accompanied by her  who also contributed to the following history.     The patient developed imbalance and difficulty walking in 2013. She was having very frequent falls and she also had memory loss. MOCA score in November 2018 was 17/30. PET scan of the brain reviewed personally and showed parietal and temporal hypometabolism consistent with Alzheimer's type dementia. She also described hallucinations. In December 2018, we started Aricept. We also referred to PT.     She didn't do PT but she has not fallen and feels stronger in her legs. No side effects on the aricept and she is taking 10 mg nightly. No nightmares or diarrhea/stomach upset. No hallucinations. Her and her  note she is doing really well.     Review of Systems:   Comprehensive review of systems is negative except as mentioned in the HPI    Allergies:  Review of patient's allergies indicates:   Allergen Reactions    Codeine      Other reaction(s): ithing and nausea    Hydrocodone Itching       Current Medications:  Current Outpatient Medications   Medication Sig Dispense Refill    acetaminophen (TYLENOL) 325 MG tablet Take 325 mg by mouth every 6 (six) hours as needed for Pain.      ALPRAZolam (XANAX) 0.5 MG tablet TAKE 1 TABLET BY MOUTH 2 TIMES DAILY AS NEEDED FOR ANXIETY 60 tablet 1    diphenhydrAMINE (BENADRYL) 25 mg capsule Take 25 mg by mouth every 6 (six) hours as needed for Itching.      donepezil (ARICEPT) 10 MG tablet Take 1 tablet (10 mg total) by mouth every evening. 30 tablet 11    gabapentin (NEURONTIN) 600 MG tablet Take 600 mg by mouth 2 (two) times daily.  0    lansoprazole (PREVACID) 30 MG capsule Take 30 mg by mouth once  "daily.      levothyroxine (SYNTHROID) 50 MCG tablet Take 50 mcg by mouth before breakfast.      lithium (ESKALITH) 300 MG capsule TAKE 2 CAPSULES DAILY AS DIRECTED 60 capsule 6    fluticasone-vilanterol (BREO) 200-25 mcg/dose DsDv diskus inhaler Inhale 1 puff into the lungs once daily. Controller 1 each 0    hydroCHLOROthiazide (HYDRODIURIL) 50 MG tablet TAKE 1 TABLET BY MOUTH DAILY 30 tablet 9    loratadine (CLARITIN) 10 mg tablet Take 10 mg by mouth once daily.       No current facility-administered medications for this visit.        Past Medical History:  Past Medical History:   Diagnosis Date    Abdominal pain     Adverse effect of drug/medicinal     Bipolar affective disorder     Bunion     Cancer     COPD (chronic obstructive pulmonary disease)     Elevated alkaline phosphatase level     Encounter for therapeutic drug monitoring     Essential hypertension, benign     Family history of Alzheimer's disease     Foot pain, bilateral     GERD (gastroesophageal reflux disease)     Hip pain, right     Hyperglycemia     Hypokalemia     Knee pain     Murmur     Sciatica     Skin tag        Past Surgical History:  Past Surgical History:   Procedure Laterality Date    ADENOIDECTOMY      CHOLECYSTECTOMY      FOOT SURGERY Left 06/20/2006    MULTIPLE TOOTH EXTRACTIONS  04/2010    TONSILLECTOMY      TOTAL ABDOMINAL HYSTERECTOMY         Family History:  family history includes Heart attack in her mother; Heart disease in her mother; Heart failure in her mother; Kidney disease in her father; Stroke in her maternal grandfather.    Social History:   reports that  has never smoked. she has never used smokeless tobacco. She reports that she does not drink alcohol or use drugs.    Physical Exam:  Vitals:    02/06/19 1410   BP: 134/62   Pulse: 70   Resp: 20   Weight: 61.3 kg (135 lb 1.6 oz)   Height: 5' 4.5" (1.638 m)   PainSc: 0-No pain     Body mass index is 22.83 kg/m².  General: Well developed, well " nourished.  No acute distress.  Musculoskeletal: No obvious joint deformities, moves all extremities well.  Peripheral vascular: No edema noted    Neurological Exam:  Mental status: Awake and alert  Speech language: No dysarthria or aphasia on conversation  Cranial nerves: Face symmetric  Motor: Moves all extremities well  Coordination: No ataxia. No tremor.     Data:  I have personally reviewed the referring provider's notes, labs, & imaging made available to me today.       Labs:  CBC:   Lab Results   Component Value Date    WBC 6.12 09/05/2018    HGB 14.0 09/05/2018    HCT 44.5 09/05/2018     09/05/2018     (H) 09/05/2018    RDW 14.6 (H) 09/05/2018     BMP:   Lab Results   Component Value Date     09/05/2018    K 3.8 09/05/2018     09/05/2018    CO2 28 09/05/2018    BUN 18 09/05/2018    CREATININE 1.26 09/05/2018    GLU 89 09/05/2018    CALCIUM 10.5 (H) 09/05/2018     LFTS;   Lab Results   Component Value Date    PROT 8.1 09/05/2018    ALBUMIN 4.7 09/05/2018    BILITOT 0.5 09/05/2018    AST 31 09/05/2018    ALKPHOS 104 09/05/2018    ALT 29 09/05/2018     COAGS: No results found for: INR, PROTIME, PTT  FLP: No results found for: CHOL, HDL, LDLCALC, TRIG, CHOLHDL      Imaging:  I have personally reviewed the imaging, MRI brain shows hydrocephalus ex vacuo and atrophy.     Assessment and Plan:  Ms. Duvall is a 68 y.o. female who presents for followup of dementia. Her PET scan was consistent with alzheimer's but her clinical picture fits more with PSP or lewy body dementia. She seems to have responded very well to aricept which makes LBD more likely, especially with the hallucinations stopping. Her walking is better and she has not had any falls. I ordered PT but she did not complete. We will continue aricept 10 mg nightly and I will see her back in 3-6 months.     Late onset Alzheimer's disease with behavioral disturbance    Hydrocephalus ex vacuo    Frequent falls    Gait  abnormality    Hallucination    Other orders  -     donepezil (ARICEPT) 10 MG tablet; Take 1 tablet (10 mg total) by mouth every evening.  Dispense: 30 tablet; Refill: 11

## 2019-04-03 ENCOUNTER — TELEPHONE (OUTPATIENT)
Dept: NEUROLOGY | Facility: CLINIC | Age: 69
End: 2019-04-03

## 2019-04-03 NOTE — TELEPHONE ENCOUNTER
----- Message from Vernon Rangel sent at 4/3/2019  3:16 PM CDT -----  Type: Needs Medical Advice    Who Called:  Patient  Best Call Back Number: [phone]  Additional Information: Would like to speak to office regarding several bad episodes she has been having. Please call to advise

## 2019-12-04 ENCOUNTER — OFFICE VISIT (OUTPATIENT)
Dept: NEUROLOGY | Facility: CLINIC | Age: 69
End: 2019-12-04

## 2019-12-04 VITALS
BODY MASS INDEX: 22.35 KG/M2 | WEIGHT: 134.13 LBS | SYSTOLIC BLOOD PRESSURE: 101 MMHG | DIASTOLIC BLOOD PRESSURE: 69 MMHG | HEART RATE: 50 BPM | RESPIRATION RATE: 20 BRPM | HEIGHT: 65 IN

## 2019-12-04 DIAGNOSIS — R29.6 FREQUENT FALLS: ICD-10-CM

## 2019-12-04 DIAGNOSIS — G91.8 HYDROCEPHALUS EX VACUO: ICD-10-CM

## 2019-12-04 DIAGNOSIS — F02.818 LEWY BODY DEMENTIA WITH BEHAVIORAL DISTURBANCE: Primary | ICD-10-CM

## 2019-12-04 DIAGNOSIS — R44.3 HALLUCINATION: ICD-10-CM

## 2019-12-04 DIAGNOSIS — G31.83 LEWY BODY DEMENTIA WITH BEHAVIORAL DISTURBANCE: Primary | ICD-10-CM

## 2019-12-04 PROCEDURE — 99213 OFFICE O/P EST LOW 20 MIN: CPT | Mod: PBBFAC,PN | Performed by: PSYCHIATRY & NEUROLOGY

## 2019-12-04 PROCEDURE — 1125F AMNT PAIN NOTED PAIN PRSNT: CPT | Mod: ,,, | Performed by: PSYCHIATRY & NEUROLOGY

## 2019-12-04 PROCEDURE — 1125F PR PAIN SEVERITY QUANTIFIED, PAIN PRESENT: ICD-10-PCS | Mod: ,,, | Performed by: PSYCHIATRY & NEUROLOGY

## 2019-12-04 PROCEDURE — 99999 PR PBB SHADOW E&M-EST. PATIENT-LVL III: CPT | Mod: PBBFAC,,, | Performed by: PSYCHIATRY & NEUROLOGY

## 2019-12-04 PROCEDURE — 99214 OFFICE O/P EST MOD 30 MIN: CPT | Mod: S$PBB,,, | Performed by: PSYCHIATRY & NEUROLOGY

## 2019-12-04 PROCEDURE — 99999 PR PBB SHADOW E&M-EST. PATIENT-LVL III: ICD-10-PCS | Mod: PBBFAC,,, | Performed by: PSYCHIATRY & NEUROLOGY

## 2019-12-04 PROCEDURE — 1159F PR MEDICATION LIST DOCUMENTED IN MEDICAL RECORD: ICD-10-PCS | Mod: ,,, | Performed by: PSYCHIATRY & NEUROLOGY

## 2019-12-04 PROCEDURE — 1159F MED LIST DOCD IN RCRD: CPT | Mod: ,,, | Performed by: PSYCHIATRY & NEUROLOGY

## 2019-12-04 PROCEDURE — 99214 PR OFFICE/OUTPT VISIT, EST, LEVL IV, 30-39 MIN: ICD-10-PCS | Mod: S$PBB,,, | Performed by: PSYCHIATRY & NEUROLOGY

## 2019-12-04 RX ORDER — DONEPEZIL HYDROCHLORIDE 5 MG/1
15 TABLET, FILM COATED ORAL NIGHTLY
Qty: 90 TABLET | Refills: 11 | Status: SHIPPED | OUTPATIENT
Start: 2019-12-04 | End: 2019-12-23 | Stop reason: SDUPTHER

## 2019-12-04 RX ORDER — CLONIDINE HYDROCHLORIDE 0.1 MG/1
0.1 TABLET ORAL DAILY PRN
Refills: 3 | COMMUNITY
Start: 2019-11-21

## 2019-12-04 RX ORDER — TRAMADOL HYDROCHLORIDE 50 MG/1
50 TABLET ORAL DAILY PRN
Refills: 0 | COMMUNITY
Start: 2019-11-21

## 2019-12-04 NOTE — PROGRESS NOTES
Date: 12/4/2019    Patient ID: Mary Duvall is a 69 y.o. female.    Chief Complaint: Fall; Alzheimer's; and Gait Problem      History of Present Illness:  Ms. Duvall is a 69 y.o. female who presents for followup of dementia--question lewy body dementia or PSP (PET showed Alzheimer's disease). The patient was accompanied by her  who also contributed to the following history.      The patient developed imbalance and difficulty walking in 2013. She was having very frequent falls and she also had memory loss. MOCA score in November 2018 was 17/30. PET scan of the brain reviewed personally and showed parietal and temporal hypometabolism consistent with Alzheimer's type dementia. She also described hallucinations. In December 2018, we started Aricept. This initially helped with the We also referred to PT but she did not do this.      She has home health. She recently was having more hallucinations. We checked labs and lithium level was normal. Cr was slightly high. Urinalysis showed leukocyte esterase but negative WBC and nitrates. No bacteria grew out.      She had 4 falls 2 weeks ago. She has not been sleeping well lately but has the past 2 nights. She has been having more hallucinations. She hears [place] music but doesn't see them. When she sleeps she hears them. She hears Christianity and war music.     They feel the memory is better. Her  notes it comes and goes.    No burning urination. No fevers.      She is currently without insurance.     Review of Systems:   All other systems were reviewed and negative except as mentioned in the HPI    Allergies:  Review of patient's allergies indicates:   Allergen Reactions    Codeine      Other reaction(s): ithing and nausea    Hydrocodone Itching       Current Medications:  Current Outpatient Medications   Medication Sig Dispense Refill    acetaminophen (TYLENOL) 325 MG tablet Take 325 mg by mouth every 6 (six) hours as needed for Pain.       ALPRAZolam (XANAX) 0.5 MG tablet TAKE 1 TABLET BY MOUTH 2 TIMES DAILY AS NEEDED FOR ANXIETY 60 tablet 1    cloNIDine (CATAPRES) 0.1 MG tablet Take 0.1 mg by mouth once daily.  3    levothyroxine (SYNTHROID) 50 MCG tablet Take 50 mcg by mouth before breakfast.      lithium (ESKALITH) 300 MG capsule TAKE 2 CAPSULES DAILY AS DIRECTED 60 capsule 6    traMADol (ULTRAM) 50 mg tablet Take 50 mg by mouth once daily.  0    donepezil (ARICEPT) 5 MG tablet Take 3 tablets (15 mg total) by mouth every evening. 90 tablet 11    fluticasone-vilanterol (BREO) 200-25 mcg/dose DsDv diskus inhaler Inhale 1 puff into the lungs once daily. Controller (Patient not taking: Reported on 12/4/2019) 1 each 0    gabapentin (NEURONTIN) 600 MG tablet Take 600 mg by mouth 2 (two) times daily.  0    hydroCHLOROthiazide (HYDRODIURIL) 50 MG tablet TAKE 1 TABLET BY MOUTH DAILY (Patient not taking: Reported on 12/4/2019) 30 tablet 9    lansoprazole (PREVACID) 30 MG capsule Take 30 mg by mouth once daily.      loratadine (CLARITIN) 10 mg tablet Take 10 mg by mouth once daily.       No current facility-administered medications for this visit.        Past Medical History:  Past Medical History:   Diagnosis Date    Abdominal pain     Adverse effect of drug/medicinal     Bipolar affective disorder     Bunion     Cancer     COPD (chronic obstructive pulmonary disease)     Elevated alkaline phosphatase level     Encounter for therapeutic drug monitoring     Essential hypertension, benign     Family history of Alzheimer's disease     Foot pain, bilateral     GERD (gastroesophageal reflux disease)     Hip pain, right     Hyperglycemia     Hypokalemia     Knee pain     Murmur     Sciatica     Skin tag        Past Surgical History:  Past Surgical History:   Procedure Laterality Date    ADENOIDECTOMY      CHOLECYSTECTOMY      COLONOSCOPY  06/28/2013    Dr. Francisco Teixeira, operative report CPS, date of scope verified by   "[facility]    FOOT SURGERY Left 06/20/2006    MULTIPLE TOOTH EXTRACTIONS  04/2010    TONSILLECTOMY      TOTAL ABDOMINAL HYSTERECTOMY         Family History:  family history includes Heart attack in her mother; Heart disease in her mother; Heart failure in her mother; Kidney disease in her father; Stroke in her maternal grandfather.    Social History:   reports that she has never smoked. She has never used smokeless tobacco. She reports that she does not drink alcohol or use drugs.    Physical Exam:  Vitals:    12/04/19 0955   BP: 101/69   Pulse: (!) 50   Resp: 20   Weight: 60.9 kg (134 lb 2.4 oz)   Height: 5' 4.5" (1.638 m)   PainSc:   9   PainLoc: Leg     Body mass index is 22.67 kg/m².  General: Well developed, well nourished.  No acute distress.  Musculoskeletal: No obvious joint deformities, moves all extremities well.  Peripheral vascular: No edema noted    Neurological Exam:  Mental status: Awake and alert. MOCA 22/30  Speech language: No dysarthria or aphasia on conversation  Cranial nerves: Face symmetric  Motor: Moves all extremities well  Coordination: No ataxia. No tremor.             Data:  I have personally reviewed other provider's notes, labs, & imaging made available to me today.       Labs:  CBC:   Lab Results   Component Value Date    WBC 6.12 09/05/2018    HGB 14.0 09/05/2018    HCT 44.5 09/05/2018     09/05/2018     (H) 09/05/2018    RDW 14.6 (H) 09/05/2018     BMP:   Lab Results   Component Value Date     09/05/2018    K 3.8 09/05/2018     09/05/2018    CO2 28 09/05/2018    BUN 18 09/05/2018    CREATININE 1.26 09/05/2018    GLU 89 09/05/2018    CALCIUM 10.5 (H) 09/05/2018     LFTS;   Lab Results   Component Value Date    PROT 8.1 09/05/2018    ALBUMIN 4.7 09/05/2018    BILITOT 0.5 09/05/2018    AST 31 09/05/2018    ALKPHOS 104 09/05/2018    ALT 29 09/05/2018     COAGS: No results found for: INR, PROTIME, PTT  FLP: No results found for: CHOL, HDL, LDLCALC, " TRIG, CHOLHDL      Imaging:  I have personally reviewed the imaging, MRI brain shows enlarged ventricles.     Assessment and Plan:  Ms. Duvall is a 69 y.o. female here for followup of dementia, likely lewy body dementia. She has predominant hallucinations and has improved significantly with aricept. I would like to try to increase her aricept to 15 mg nightly to see if that will help her with the hallucinations. We will also obtain EKG and if needed, use a low dose seroquel at night. I advised her to stop the benadryl and the xanax as those can both perpetuate delirium.     Lewy body dementia with behavioral disturbance    Hallucination  -     EKG 12-lead; Future    Frequent falls    Hydrocephalus ex vacuo    Other orders  -     donepezil (ARICEPT) 5 MG tablet; Take 3 tablets (15 mg total) by mouth every evening.  Dispense: 90 tablet; Refill: 11

## 2019-12-04 NOTE — PATIENT INSTRUCTIONS
We will try to increase aricept to 15 mg nightly to see if that helps with the halluciations.     We will also get an EKG or heart rhythm test to see if we can use another medication called seroquel to help you sleep and help with the hallucinations.

## 2019-12-10 ENCOUNTER — CLINICAL SUPPORT (OUTPATIENT)
Dept: CARDIOLOGY | Facility: CLINIC | Age: 69
End: 2019-12-10
Payer: MEDICARE

## 2019-12-10 DIAGNOSIS — R44.3 HALLUCINATION: ICD-10-CM

## 2019-12-10 PROCEDURE — 93010 EKG 12-LEAD: ICD-10-PCS | Mod: S$PBB,,, | Performed by: INTERNAL MEDICINE

## 2019-12-10 PROCEDURE — 93010 ELECTROCARDIOGRAM REPORT: CPT | Mod: S$PBB,,, | Performed by: INTERNAL MEDICINE

## 2019-12-10 PROCEDURE — 93005 ELECTROCARDIOGRAM TRACING: CPT | Mod: PBBFAC,PO | Performed by: INTERNAL MEDICINE

## 2019-12-12 ENCOUNTER — TELEPHONE (OUTPATIENT)
Dept: NEUROLOGY | Facility: CLINIC | Age: 69
End: 2019-12-12

## 2019-12-23 ENCOUNTER — TELEPHONE (OUTPATIENT)
Dept: NEUROLOGY | Facility: CLINIC | Age: 69
End: 2019-12-23

## 2019-12-23 RX ORDER — QUETIAPINE FUMARATE 25 MG/1
TABLET, FILM COATED ORAL
Qty: 30 TABLET | Refills: 11 | Status: SHIPPED | OUTPATIENT
Start: 2019-12-23 | End: 2020-01-29 | Stop reason: SDUPTHER

## 2019-12-23 RX ORDER — DONEPEZIL HYDROCHLORIDE 5 MG/1
10 TABLET, FILM COATED ORAL NIGHTLY
Qty: 60 TABLET | Refills: 11 | Status: SHIPPED | OUTPATIENT
Start: 2019-12-23 | End: 2020-12-22

## 2019-12-23 NOTE — TELEPHONE ENCOUNTER
Returned call to [facility] Nurse Sruthi and relayed message about medications. Verbalized understanding.

## 2019-12-23 NOTE — TELEPHONE ENCOUNTER
Patient did not feel well on higher dose of aricept. Will decrease back down to 10 mg nightly. Will add low dose seroquel as well.

## 2020-01-29 RX ORDER — QUETIAPINE FUMARATE 25 MG/1
25 TABLET, FILM COATED ORAL NIGHTLY
Qty: 30 TABLET | Refills: 11 | Status: SHIPPED | OUTPATIENT
Start: 2020-01-29

## 2020-05-18 ENCOUNTER — TELEPHONE (OUTPATIENT)
Dept: NEUROLOGY | Facility: CLINIC | Age: 70
End: 2020-05-18

## 2020-05-18 NOTE — TELEPHONE ENCOUNTER
----- Message from Roberto Tapia sent at 5/18/2020 12:18 PM CDT -----  Contact: pt   Pt would like a call back regarding scheduling follow up appointment       Pt can be reached at  [phone]

## 2020-11-07 PROBLEM — I20.0 UNSTABLE ANGINA: Status: ACTIVE | Noted: 2020-11-07

## 2020-11-07 PROBLEM — R41.82 ALTERED MENTAL STATUS: Status: ACTIVE | Noted: 2020-11-07

## 2020-11-07 PROBLEM — G92.9 TOXIC ENCEPHALOPATHY: Status: ACTIVE | Noted: 2020-11-07

## 2020-11-07 PROBLEM — R41.82 ALTERED MENTAL STATUS: Status: RESOLVED | Noted: 2020-11-07 | Resolved: 2020-11-07

## 2021-05-01 NOTE — TELEPHONE ENCOUNTER
----- Message from Denia Martin MD sent at 12/12/2019  1:18 PM CST -----  Her QT interval is not too high. Is she still having hallucinations on the higher dose of aricept? If so, I will call her in a prescription for low dose of seroquel which can help with hallucinations.  
Voicemail not set up.  
If you are a smoker, it is important for your health to stop smoking. Please be aware that second hand smoke is also harmful.

## 2022-03-17 NOTE — TELEPHONE ENCOUNTER
----- Message from Elsie Pérez sent at 12/7/2018  1:00 PM CST -----  Contact: [phone]  Patient is requesting a call back from the nurse.  Please call the patient upon request at phone number [phone].    Lian Jeffery